# Patient Record
Sex: FEMALE | Race: WHITE | URBAN - METROPOLITAN AREA
[De-identification: names, ages, dates, MRNs, and addresses within clinical notes are randomized per-mention and may not be internally consistent; named-entity substitution may affect disease eponyms.]

---

## 2022-01-31 ENCOUNTER — OFFICE VISIT (OUTPATIENT)
Dept: ORTHOPEDIC SURGERY | Age: 37
End: 2022-01-31
Payer: COMMERCIAL

## 2022-01-31 VITALS — WEIGHT: 293 LBS | BODY MASS INDEX: 44.41 KG/M2 | HEIGHT: 68 IN

## 2022-01-31 DIAGNOSIS — M25.562 LEFT KNEE PAIN, UNSPECIFIED CHRONICITY: Primary | ICD-10-CM

## 2022-01-31 PROCEDURE — 99204 OFFICE O/P NEW MOD 45 MIN: CPT | Performed by: PHYSICIAN ASSISTANT

## 2022-01-31 NOTE — PROGRESS NOTES
Chief Complaint  Knee Pain (new patient left knee )      History of Present Illness:  Lissa Blank is a pleasant 29 y.o. female here for evaluation of left knee pain. Patient states that back in October she had a fall on a bent knee in the driveway that was very hard. Since then she has had some mild anterior knee pain. Patient noticed that the day after [place], after she had been quite active for several days in [place], she started experiencing pain and swelling and decreased range of motion. She presented to her primary care provider, he was concern for meniscus tear and an MRI was ordered. The MRI revealed swelling and a Baker's cyst but no meniscal involvement. Patient describes pain with kneeling and pain that is primarily anterior. She also describes an occasional catching sensation. Medical History:  Patient's medications, allergies, past medical, surgical, social and family histories were reviewed and updated as appropriate. Pain Assessment  Location of Pain: Knee  Location Modifiers: Left  Severity of Pain: 6  Quality of Pain: Dull,Aching  Duration of Pain: Persistent  Frequency of Pain: Constant  Aggravating Factors: Bending,Walking  Limiting Behavior: Yes  Relieving Factors: Rest  Result of Injury: Yes  Work-Related Injury: No  Are there other pain locations you wish to document?: No  ROS: Review of systems reviewed from Patient History Form completed today and available in the patient's chart under the Media tab. Pertinent items are noted in HPI  Review of systems reviewed from Patient History Form completed today and available in the patient's chart under the Media tab. Vital Signs:  Ht 5' 8\" (1.727 m)   Wt 295 lb (133.8 kg)   BMI 44.85 kg/m²      Left knee examination:    Gait: No use of assistive devices. No antalgic gait. Alignment: Alignment appreciated. Inspection/skin: Quadriceps well developed. Skin is intact without erythema or ecchymosis.  No gross deformity. Palpation: Crepitus. Nontender along joint line. No pain with compression of patella. Nontender to light touch. Range of Motion: Full ROM. Strength: 5/5 quad strength    Effusion: No apparent effusion. Ligamentous stability: Stable to valgus and varus stress at 0° and 30°. Solid endpoint with Lachman's. Negative posterior and anterior drawer signs. Patella tracking: Smooth translation of patella. Special tests: Negative Miguel sign. Patella apprehension sign negative. Neurologic and vascular: Skin is warm and well-perfused. Distally neurovascularly intact. Additional findings: Calf soft nontender. Sensation is intact to light-touch. No pretibial edema. Radiology:       Pertinent imaging was interpreted and reviewed with the patient today, images only - no report available. Radiographs were obtained and reviewed in the office; 4 views: bilateral PA, bilateral Devine, bilateral Merchants AND left lateral    Impression: No acute fracture or dislocation. No osseous abnormalities. There is no appreciable soft tissue swelling or joint effusion. There are no lytic or blastic lesions. Left knee MRI:  1. 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 Findings of patellar maltracking  3. No meniscal or ligamentous injuries  4. 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     Left knee pain, unspecified chronicity Yes       Office Procedures:  Orders Placed This Encounter   Procedures    XR KNEE LEFT (MIN 4 VIEWS)     Order Specific Question:   Reason for exam:     Answer:   PAIN    XR KNEE RIGHT (3 VIEWS)     Order Specific Question:   Reason for exam:     Answer:   PAIN         Plan: Pertinent imaging was reviewed. The etiology, natural history, and treatment options for the disorder were discussed. The roles of activity medication, antiinflammatories, injections, bracing, physical therapy, and surgical interventions were all described to the patient and questions were answered. Chest he suffered a fall several months ago on a bent knee. I suspect that this is where she sustained the subtle cartilage damage underneath her kneecap. This paired with longstanding patellar malalignment is likely because of her persistent swelling in her Baker's cyst.  She is taking meloxicam I would like her to continue doing so. I believe she is a candidate for formal supravesical therapy working on range of motion and strengthening. She would like to proceed with this. If no improvement she would be candidate for a cortisone injection. Follow-up in 1 month or sooner if worsening symptoms  Jaelyn Lucas is in agreement with this plan. All questions were answered to patient's satisfaction and was encouraged to call with any further questions. Total time spent for evaluation, education, and development of treatment plan: 49 minutes    See Acosta, [address] [place] [address]  1/31/2022    This dictation was performed with a verbal recognition program [facility]) and it was checked for errors. It is possible that there are still dictated errors within this office note. If so, please bring any areas to my attention for an addendum. All efforts were made to ensure that this office note is accurate.

## 2022-03-25 ENCOUNTER — HOSPITAL ENCOUNTER (OUTPATIENT)
Dept: PHYSICAL THERAPY | Age: 37
Setting detail: THERAPIES SERIES
Discharge: HOME | End: 2022-03-25

## 2022-03-25 NOTE — FLOWSHEET NOTE
Physical Therapy  Cancellation/No-show Note  Patient Name:  Doris Lopez  :  1985   Date:  3/25/2022  Cancelled visits to date: 1  No-shows to date: 0    For today's appointment patient:  [x]  Cancelled  []  Rescheduled appointment  []  No-show     Reason given by patient:  []  Patient ill  []  Conflicting appointment  []  No transportation    []  Conflict with work  []  No reason given  [x]  Other:     Comments: Needed to get kids      Electronically signed by:  Wade Crystal, PT, DPT

## 2022-04-05 ENCOUNTER — APPOINTMENT (OUTPATIENT)
Dept: PHYSICAL THERAPY | Age: 37
End: 2022-04-05
Payer: COMMERCIAL

## 2022-04-08 ENCOUNTER — HOSPITAL ENCOUNTER (OUTPATIENT)
Dept: PHYSICAL THERAPY | Age: 37
Setting detail: THERAPIES SERIES
Discharge: HOME | End: 2022-04-08
Payer: COMMERCIAL

## 2022-04-08 PROCEDURE — 97161 PT EVAL LOW COMPLEX 20 MIN: CPT

## 2022-04-08 PROCEDURE — 97110 THERAPEUTIC EXERCISES: CPT

## 2022-04-08 PROCEDURE — 97112 NEUROMUSCULAR REEDUCATION: CPT

## 2022-04-08 NOTE — PLAN OF CARE
The [address] [place] [address]  Phone [phone]  Fax [phone]                                                       Physical Therapy Certification    Dear Referring Practitioner: Jeremiah Lara PA-C,    We had the pleasure of evaluating the following patient for physical therapy services at [address] [place] [address]. A summary of our findings can be found in the initial assessment below. This includes our plan of care. If you have any questions or concerns regarding these findings, please do not hesitate to contact me at the office phone number checked above. Thank you for the referral.       Physician Signature:_______________________________Date:__________________  By signing above (or electronic signature), therapists plan is approved by physician      Patient: Marizol Rausch   : 1985   MRN: [id number]  Referring Physician: Referring Practitioner: Jeremiah Lara PA-C      Evaluation Date: 2022      Medical Diagnosis Information:  Diagnosis: M25.562 Left knee pain   Treatment Diagnosis: M25.562 Left knee pain                                         Insurance information: PT Insurance Information: [employer]     Precautions/ Contra-indications: N/A    C-SSRS Triggered by Intake questionnaire (Past 2 wk assessment):   [x] No, Questionnaire did not trigger screening.   [] Yes, Patient intake triggered further evaluation      [] C-SSRS Screening completed  [] PCP notified via Plan of Care  [] Emergency services notified     Latex Allergy:  [x]NO      []YES  Preferred Language for Healthcare:   [x]English       []other:    SUBJECTIVE: Patient arrived to physical therapy with c/o L knee pain. Symptoms began after she experienced a fall on a bent knee in her driveway on [address].  Since then she has had anterior knee discomfort that increases with squatting, stairs and being on her feet for prolonged periods of time. Originally saw her PCP who ordered and MRI with results provided below. She saw referring provider on 1/31/22 and was instructed to continue to take Meloxicam and attempt PT. If symptoms do not improve it was recommended she f/u with referring practitioner for possible injection. Has not been able to start PT d/t having COVID and then traveling. Wears inserts in shoes that she exercises in. Left knee MRI: January 2022  1. 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 Findings of patellar maltracking  3. No meniscal or ligamentous injuries  4. Small suprapatellar effusion       Relevant Medical History: R ACL reconstruction 2003, Patella maltracking 20 years ago treated with bracing and PT. Functional Disability Index: FOTO knee 47     Pain Scale: 1-6/10  Easing factors: Meloxicam and icing  Provocative factors: Squatting, stairs, prolonged sitting, long walks or cleaning all day.       Type: [x]Constant   [x]Intermittent  []Radiating []Localized []other:     Numbness/Tingling: Denies     Occupation/School:     Living Status/Prior Level of Function: Independent with ADLs and IADLs, walking for exercise, light weight lifting and yoga    OBJECTIVE:      Flexibility L R Comment   Hamstring 162 170 Measured 90/90    Gastroc 100 95    Nikki Mild restriction WNL    Ely WNL WNL            ROM PROM AROM Comment    L R L R    Flexion   130 130    Extension   +1 +2                        Strength L R Comment   Quad 5 5    Hamstring 5 5    Hip  flexion 4- 4    Hip abd 5 5    Hip add 5 mild pain 5    Hip ext  5 5        Special Test Results/Comment   Patella compression Neg bilat   Crepitus Neg bilat    Flexion Test Neg R  Pos L mild increase in pain    Valgus Laxity Neg bilat for pain or instability    Varus Laxity Neg bilat for pain or instability    McMurrays Neg R  Pos L medial and laterally        Girth L R   Joint line  48 cm 48 cm      Reflexes/Sensation:    [x]Dermatomes/Myotomes intact    []Reflexes equal and normal bilaterally   []Other:    Joint mobility: Patella    [x]Normal    []Hypo   []Hyper    Palpation: 's cyst palpable L posterior knee. Tenderness bilateral pes anserine     Functional Mobility/Transfers:   SLS: Eyes open, no Trendelenburg and no postural sway, mild pain on L  DL squat: depth 75% of normal with c/o L knee pain preventing her from going further. Posture: Pes cavus bilaterally in standing, mild lateral patella tilt and glide bilaterally    Bandages/Dressings/Incisions:  N/A    Gait: Independent, WNL     Orthopedic Special Tests: See above                        [x] Patient history, allergies, meds reviewed. Medical chart reviewed. See intake form. Review Of Systems (ROS):  [x]Performed Review of systems (Integumentary, CardioPulmonary, Neurological) by intake and observation. Intake form has been scanned into medical record. Patient has been instructed to contact their primary care physician regarding ROS issues if not already being addressed at this time.       Co-morbidities/Complexities (which will affect course of rehabilitation):   []None           Arthritic conditions   []Rheumatoid arthritis (M05.9)  []Osteoarthritis (M19.91)   Cardiovascular conditions   []Hypertension (I10)  []Hyperlipidemia (E78.5)  []Angina pectoris (I20)  []Atherosclerosis (I70)   Musculoskeletal conditions   []Disc pathology   []Congenital spine pathologies   [x]Prior surgical intervention  []Osteoporosis (M81.8)  []Osteopenia (M85.8)   Endocrine conditions   []Hypothyroid (E03.9)  []Hyperthyroid Gastrointestinal conditions   []Constipation (Q78.66)   Metabolic conditions   []Morbid obesity (E66.01)  []Diabetes type 1(E10.65) or 2 (E11.65)   []Neuropathy (G60.9)     Pulmonary conditions   []Asthma (J45)  []Coughing   []COPD (J44.9)   Psychological Disorders  []Anxiety (F41.9)  []Depression (F32.9)   []Other:   []Other:          Barriers to/and or personal factors that will affect rehab potential:              []Age  []Sex              []Motivation/Lack of Motivation                        []Co-Morbidities              []Cognitive Function, education/learning barriers              []Environmental, home barriers              [x]profession/work barriers  []past PT/medical experience  []other:  Justification: Pt job duties requiring her to be on her feet for long periods of time as well as squat and kneel presents as a barrier to treatment. Falls Risk Assessment (30 days):   [x] Falls Risk assessed and no intervention required.   [] Falls Risk assessed and Patient requires intervention due to being higher risk   TUG score (>12s at risk):     [] Falls education provided, including     ASSESSMENT:   Functional Impairments:     []Noted lumbar/proximal hip/LE hypomobility   []Decreased LE functional ROM   [x]Decreased core/proximal hip strength and neuromuscular control   [x]Decreased LE functional strength   []Reduced balance/proprioceptive control   []other:      Functional Activity Limitations (from functional questionnaire and intake)   []Reduced ability to tolerate prolonged functional positions   []Reduced ability or difficulty with changes of positions or transfers between positions   []Reduced ability to maintain good posture and demonstrate good body mechanics with sitting, bending, and lifting   []Reduced ability to sleep   [] Reduced ability or tolerance with driving and/or computer work   [x]Reduced ability to perform lifting, carrying tasks   [x]Reduced ability to squat   []Reduced ability to forward bend   [x]Reduced ability to ambulate prolonged functional periods/distances/surfaces   [x]Reduced ability to ascend/descend stairs   [x]Reduced ability to run, hop or jump   []other:     Participation assessment instrument and/or measurable assessment of functional outcome. [x] EVAL (LOW) 81102 (typically 20 minutes face-to-face)  [] EVAL (MOD) 32768 (typically 30 minutes face-to-face)  [] EVAL (HIGH) 30101 (typically 45 minutes face-to-face)  [] RE-EVAL     PLAN  Frequency/Duration:  1 days per week for 6 Weeks:  Interventions:  [x]  Therapeutic exercise including: strength training, ROM, for Lower extremity and core   [x]  NMR activation and proprioception for LE, Glutes and Core   [x]  Manual therapy as indicated for LE, Hip and spine to include: Dry Needling/IASTM, STM, PROM, Gr I-IV mobilizations, manipulation. [x] Modalities as needed that may include: thermal agents, E-stim, Biofeedback, US, iontophoresis as indicated  [x] Patient education on joint protection, postural re-education, activity modification, progression of HEP. HEP instruction:   Access Code: [email or web address]  URL: [email or web address]  Date: 04/08/2022  Prepared by: Pinky Snowden  Hooklying Hamstring Stretch with Strap - 1-2 x daily - 7 x weekly - 1 sets - 3 reps - 30 hold  Supine ITB Stretch with Strap - 1-3 x daily - 7 x weekly - 1 sets - 3 reps - 30 hold  Standing Terminal Knee Extension with Resistance - 1 x daily - 7 x weekly - 3 sets - 10 reps  Seated Long Arc Quad with Hip Adduction - 1 x daily - 7 x weekly - 3 sets - 10 reps  Straight Leg Raise with Arm Support - 1 x daily - 7 x weekly - 1 sets - 3 reps - 20-30 hold      GOALS:   Patient stated goal: Eliminate knee pain in order to return to exercising independently     [] Progressing: [] Met: [] Not Met: [] Adjusted    Therapist goals for Patient:   Short Term Goals: To be achieved in: 3 weeks  1. Independent in HEP and progression per patient tolerance, in order to prevent re-injury. [] Progressing: [] Met: [] Not Met: [] Adjusted   2.  Patient will have a decrease in pain to facilitate improvement in movement, function, and ADLs as indicated by Functional Deficits. [] Progressing: [] Met: [] Not Met: [] Adjusted    Long Term Goals: To be achieved in: 6 weeks  1. Patient will score a 71 or higher on FOTO knee assessment indicating subjective ability to complete advanced ambulation without pain or issues. [] Progressing: [] Met: [] Not Met: [] Adjusted  2. Patient will demonstrate increased L LE flexibility that is equal to R causing decrease tension at L knee and ability for pt to sit for prolonged periods of time without c/o knee pain    [] Progressing: [] Met: [] Not Met: [] Adjusted  3. Patient will demonstrate an increase in L LE Strength to 5/5 in all directions tested to allow for proper functional mobility such as negotiating a flight of stairs with proper sequencing and no c/o pain. [] Progressing: [] Met: [] Not Met: [] Adjusted  4. Patient will complete DL squat with normal depth and no c/o pain. [] Progressing: [] Met: [] Not Met: [] Adjusted         Electronically signed by:  Juan Webber PT    Note: If patient does not return for scheduled/ recommended follow up visits, this note will serve as a discharge from care along with most recent update on progress.

## 2022-04-08 NOTE — FLOWSHEET NOTE
[address] [facility], [facility] [email or web address] [facility], [address] [place] [address]  Phone: [phone]   Fax:     [phone]    Physical Therapy Daily Treatment Note  Date:  2022    Patient Name:  Caterina Lara    :  1985  MRN: [id number]  Restrictions/Precautions:    Medical/Treatment Diagnosis Information:  · Diagnosis: M25.562 Left knee pain  · Treatment Diagnosis: M25.562 Left knee pain  Insurance/Certification information:  PT Insurance Information: [place]  Physician Information:  Referring Practitioner: Layla Arevalo PA-C  Has the plan of care been signed (Y/N):        []  Yes  [x]  No     Date of Patient follow up with Physician:       Is this a Progress Report:     []  Yes  [x]  No        If Yes:  Date Range for reporting period:  Beginning   Ending    Progress report will be due (10 Rx or 30 days whichever is less):        Recertification will be due (POC Duration  / 90 days whichever is less): 22         Visit # Insurance Allowable Auth Required   1 [place]  40 visits  3 used []  Yes [x]  No        Functional Scale: FOTO knee 47    Date assessed:  22       Latex Allergy:  [x]NO      []YES  Preferred Language for Healthcare:   [x]English       []other:    Pain level:  1/10     SUBJECTIVE:  See eval    OBJECTIVE: See eval   Observation:    Test measurements:      RESTRICTIONS/PRECAUTIONS: N/A    Exercises/Interventions:   Exercise/Equipment Resistance/Repetitions Other comments   Stretching/AROM       Hamstring 30\"hx3 L 4/8 supine   ITB stretch  30\"hx3 L 4/8 supine                    Strengthening      SLR + 10\"hx5 L 4/8 hook lying                           Neuromuscular re-ed      TKE  [place] TB 3x10 L 4/8   LAQ with BS 5\"hx10 L 4/8 seated EOT 90<>45 deg                Quantum machines       Leg press        Leg extension       Leg curl               Manual interventions                Gabby taping L patella to correct lateral tilt and glide 4/8                Therapeutic Exercise and NMR EXR  [x] (61725) Provided verbal/tactile cueing for activities related to strengthening, flexibility, endurance, ROM for improvements in LE, proximal hip, and core control with self care, mobility, lifting, ambulation.  [] (62790) Provided verbal/tactile cueing for activities related to improving balance, coordination, kinesthetic sense, posture, motor skill, proprioception  to assist with LE, proximal hip, and core control in self care, mobility, lifting, ambulation and eccentric single leg control.      NMR and Therapeutic Activities:    [x] (78757 or 54362) Provided verbal/tactile cueing for activities related to improving balance, coordination, kinesthetic sense, posture, motor skill, proprioception and motor activation to allow for proper function of core, proximal hip and LE with self care and ADLs  [] (71262) Gait Re-education- Provided training and instruction to the patient for proper LE, core and proximal hip recruitment and positioning and eccentric body weight control with ambulation re-education including up and down stairs     Home Exercise Program:    [x] (97478) Reviewed/Progressed HEP activities related to strengthening, flexibility, endurance, ROM of core, proximal hip and LE for functional self-care, mobility, lifting and ambulation/stair navigation   [] (52073)Reviewed/Progressed HEP activities related to improving balance, coordination, kinesthetic sense, posture, motor skill, proprioception of core, proximal hip and LE for self care, mobility, lifting, and ambulation/stair navigation      Manual Treatments:  PROM / STM / Oscillations-Mobs:  G-I, II, III, IV (PA's, Inf., Post.)  [] (42088) Provided manual therapy to mobilize LE, proximal hip and/or LS spine soft tissue/joints for the purpose of modulating pain, promoting relaxation,  increasing ROM, reducing/eliminating soft tissue swelling/inflammation/restriction, improving soft tissue extensibility and allowing for proper ROM for normal function with self care, mobility, lifting and ambulation. Modalities:      Charges:  Timed Code Treatment Minutes: 24'+eval    Total Treatment Minutes: 2:39-3:35  64'       [x] EVAL (LOW) 24969 (typically 20 minutes face-to-face)  [] EVAL (MOD) 85461 (typically 30 minutes face-to-face)  [] EVAL (HIGH) 52676 (typically 45 minutes face-to-face)  [] RE-EVAL   [x] LF(52278) x 1  [] IONTO  [x] NMR (02165) x 1    [] VASO  [] Manual (76477) x      [] Other:  [] TA x      [] Mech Traction (90005)  [] ES(attended) (71029)      [] ES (un) (75040):     GOALS:   Patient stated goal: Eliminate knee pain in order to return to exercising independently     []? Progressing: []? Met: []? Not Met: []? Adjusted     Therapist goals for Patient:   Short Term Goals: To be achieved in: 3 weeks  1. Independent in HEP and progression per patient tolerance, in order to prevent re-injury. []? Progressing: []? Met: []? Not Met: []? Adjusted   2. Patient will have a decrease in pain to facilitate improvement in movement, function, and ADLs as indicated by Functional Deficits. []? Progressing: []? Met: []? Not Met: []? Adjusted     Long Term Goals: To be achieved in: 6 weeks  1. Patient will score a 71 or higher on FOTO knee assessment indicating subjective ability to complete advanced ambulation without pain or issues. []? Progressing: []? Met: []? Not Met: []? Adjusted  2. Patient will demonstrate increased L LE flexibility that is equal to R causing decrease tension at L knee and ability for pt to sit for prolonged periods of time without c/o knee pain    []? Progressing: []? Met: []? Not Met: []? Adjusted  3. Patient will demonstrate an increase in L LE Strength to 5/5 in all directions tested to allow for proper functional mobility such as negotiating a flight of stairs with proper sequencing and no c/o pain. []? Progressing: []? Met: []?  Not Met: []? Adjusted  4. Patient will complete DL squat with normal depth and no c/o pain. []? Progressing: []? Met: []? Not Met: []? Adjusted    Overall Progression Towards Functional goals/ Treatment Progress Update:  [] Patient is progressing as expected towards functional goals listed. [] Progression is slowed due to complexities/Impairments listed. [] Progression has been slowed due to co-morbidities. [x] Plan just implemented, too soon to assess goals progression <30days   [] Goals require adjustment due to lack of progress  [] Patient is not progressing as expected and requires additional follow up with physician  [] Other    Prognosis for POC: [x] Good [] Fair  [] Poor      Patient requires continued skilled intervention: [x] Yes  [] No    Treatment/Activity Tolerance:  [x] Patient able to complete treatment  [] Patient limited by fatigue  [] Patient limited by pain     [] Patient limited by other medical complications  [] Other: 4/8 Tolerated taping well and responded well to exercises with appropriate fatigue and no increase in symptoms. Patient Education:         4/8 Educated on activity modification based on symptoms, proper muscle activation, use of taping annd icing for symptom control. HEP instruction:   Access Code: [email or web address]  URL: [email or web address]  Date: 04/08/2022  Prepared by: Fredrick Rosario     Exercises  Hooklying Hamstring Stretch with Strap - 1-2 x daily - 7 x weekly - 1 sets - 3 reps - 30 hold  Supine ITB Stretch with Strap - 1-3 x daily - 7 x weekly - 1 sets - 3 reps - 30 hold  Standing Terminal Knee Extension with Resistance - 1 x daily - 7 x weekly - 3 sets - 10 reps  Seated Long Arc Quad with Hip Adduction - 1 x daily - 7 x weekly - 3 sets - 10 reps  Straight Leg Raise with Arm Support - 1 x daily - 7 x weekly - 1 sets - 3 reps - 20-30 hold           PLAN: See eval  [] Continue per plan of care [] Alter current plan (see comments above)  [x] Plan of care initiated [] Hold pending MD visit [] Discharge      Electronically signed by:  Aron Aparicio PT    Note: If patient does not return for scheduled/ recommended follow up visits, this note will serve as a discharge from care along with most recent update on progress.

## 2022-04-13 ENCOUNTER — HOSPITAL ENCOUNTER (OUTPATIENT)
Dept: PHYSICAL THERAPY | Age: 37
Setting detail: THERAPIES SERIES
Discharge: HOME | End: 2022-04-13
Payer: COMMERCIAL

## 2022-04-13 NOTE — FLOWSHEET NOTE
Physical Therapy  Cancellation/No-show Note  Patient Name:  Stephen Driver  :  1985   Date:  2022  Cancelled visits to date: 0  No-shows to date: 1    For today's appointment patient:  []  Cancelled  []  Rescheduled appointment  [x]  No-show     Reason given by patient:  []  Patient ill  []  Conflicting appointment  []  No transportation    []  Conflict with work  []  No reason given  []  Other:     Comments:      Electronically signed by:  Ruddy Andrews PT, PT

## 2022-04-19 ENCOUNTER — HOSPITAL ENCOUNTER (OUTPATIENT)
Dept: PHYSICAL THERAPY | Age: 37
Setting detail: THERAPIES SERIES
Discharge: HOME | End: 2022-04-19
Payer: COMMERCIAL

## 2022-04-19 PROCEDURE — 97112 NEUROMUSCULAR REEDUCATION: CPT

## 2022-04-19 PROCEDURE — 97110 THERAPEUTIC EXERCISES: CPT

## 2022-04-19 NOTE — FLOWSHEET NOTE
[facility], [address] [place] [address], [address] [place] [address]  Phone: [phone]   Fax:     [phone]    Physical Therapy Daily Treatment Note  Date:  2022    Patient Name:  Muriel Chaves    :  1985  MRN: [id number]  Restrictions/Precautions:    Medical/Treatment Diagnosis Information:  · Diagnosis: M25.562 Left knee pain  · Treatment Diagnosis: M25.562 Left knee pain  Insurance/Certification information:  PT Insurance Information: [place]  Physician Information:  Referring Practitioner: Raven Ceron PA-C  Has the plan of care been signed (Y/N):        []  Yes  [x]  No     Date of Patient follow up with Physician:       Is this a Progress Report:     []  Yes  [x]  No        If Yes:  Date Range for reporting period:  Beginning   Ending    Progress report will be due (10 Rx or 30 days whichever is less):        Recertification will be due (POC Duration  / 90 days whichever is less): 22         Visit # Insurance Allowable Auth Required   2    [place]  40 visits  3 used []  Yes [x]  No        Functional Scale: FOTO knee 47    Date assessed:  22       Latex Allergy:  [x]NO      []YES  Preferred Language for Healthcare:   [x]English       []other:    Pain level:  1/10 4/19    SUBJECTIVE:   Pt states generally walking is better and stride feels more normal. Still has discomfort with kneeling, getting up off ground or seated position as well as stairs. 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      OBJECTIVE: See eval   Observation:    Test measurements:      RESTRICTIONS/PRECAUTIONS: N/A    Exercises/Interventions:   Exercise/Equipment Resistance/Repetitions Other comments   Stretching/AROM       Bike x5' AROM     Hamstring 30\"hx3 L  supine   ITB stretch  30\"hx3 L  supine                    Strengthening      SLR + 20\"hx3 L ^ hook lying Wall sit 30\"hx3 bilat Start 4/19 45 deg knee flexion    SLR abd with hip ext/flex 0# 3x10 L Start 4/19 side lying   [place]ridges SL 3x10 L Start 4/19 L knee bent              Neuromuscular re-ed      TKE CC 4 plates  8P49 L ^2/35   LAQ with BS 10\"hx10 L ^4/19 seated EOT 90<>45 deg   SLS with hip abd 3x10 L Start 4/19           Quantum machines       Leg press   80# 3x10 DL concentric, SL eccentric L Start 4/19 90<> 45 deg    Leg extension       Leg curl               Manual interventions                Gabby taping L patella to correct lateral tilt and glide 4/8 4/19 completed d/t positive response after last visit                Therapeutic Exercise and NMR EXR  [x] (79016) Provided verbal/tactile cueing for activities related to strengthening, flexibility, endurance, ROM for improvements in LE, proximal hip, and core control with self care, mobility, lifting, ambulation.  [] (46447) Provided verbal/tactile cueing for activities related to improving balance, coordination, kinesthetic sense, posture, motor skill, proprioception  to assist with LE, proximal hip, and core control in self care, mobility, lifting, ambulation and eccentric single leg control.      NMR and Therapeutic Activities:    [x] (50140 or 94532) Provided verbal/tactile cueing for activities related to improving balance, coordination, kinesthetic sense, posture, motor skill, proprioception and motor activation to allow for proper function of core, proximal hip and LE with self care and ADLs  [] (65711) Gait Re-education- Provided training and instruction to the patient for proper LE, core and proximal hip recruitment and positioning and eccentric body weight control with ambulation re-education including up and down stairs     Home Exercise Program:    [x] (50313) Reviewed/Progressed HEP activities related to strengthening, flexibility, endurance, ROM of core, proximal hip and LE for functional self-care, mobility, lifting and ambulation/stair navigation   [] (42695)Reviewed/Progressed HEP activities related to improving balance, coordination, kinesthetic sense, posture, motor skill, proprioception of core, proximal hip and LE for self care, mobility, lifting, and ambulation/stair navigation      Manual Treatments:  PROM / STM / Oscillations-Mobs:  G-I, II, III, IV (PA's, Inf., Post.)  [] (78332) Provided manual therapy to mobilize LE, proximal hip and/or LS spine soft tissue/joints for the purpose of modulating pain, promoting relaxation,  increasing ROM, reducing/eliminating soft tissue swelling/inflammation/restriction, improving soft tissue extensibility and allowing for proper ROM for normal function with self care, mobility, lifting and ambulation. Modalities:      Charges:  Timed Code Treatment Minutes: 39'   Total Treatment Minutes: 2:09-3:10  64'       [] EVAL (LOW) 455 1011 (typically 20 minutes face-to-face)  [] EVAL (MOD) 73453 (typically 30 minutes face-to-face)  [] EVAL (HIGH) 08772 (typically 45 minutes face-to-face)  [] RE-EVAL   [x] XG(65080) x 2  [] IONTO  [x] NMR (01286) x 1    [] VASO  [] Manual (19291) x      [] Other:  [] TA x      [] Mech Traction (20838)  [] ES(attended) (00481)      [] ES (un) (57251):     GOALS:   Patient stated goal: Eliminate knee pain in order to return to exercising independently     []? Progressing: []? Met: []? Not Met: []? Adjusted     Therapist goals for Patient:   Short Term Goals: To be achieved in: 3 weeks  1. Independent in HEP and progression per patient tolerance, in order to prevent re-injury. []? Progressing: []? Met: []? Not Met: []? Adjusted   2. Patient will have a decrease in pain to facilitate improvement in movement, function, and ADLs as indicated by Functional Deficits. []? Progressing: []? Met: []? Not Met: []? Adjusted     Long Term Goals: To be achieved in: 6 weeks  1.  Patient will score a 71 or higher on FOTO knee assessment indicating subjective ability to complete advanced ambulation without pain or issues. []? Progressing: []? Met: []? Not Met: []? Adjusted  2. Patient will demonstrate increased L LE flexibility that is equal to R causing decrease tension at L knee and ability for pt to sit for prolonged periods of time without c/o knee pain    []? Progressing: []? Met: []? Not Met: []? Adjusted  3. Patient will demonstrate an increase in L LE Strength to 5/5 in all directions tested to allow for proper functional mobility such as negotiating a flight of stairs with proper sequencing and no c/o pain. []? Progressing: []? Met: []? Not Met: []? Adjusted  4. Patient will complete DL squat with normal depth and no c/o pain. []? Progressing: []? Met: []? Not Met: []? Adjusted    Overall Progression Towards Functional goals/ Treatment Progress Update:  [] Patient is progressing as expected towards functional goals listed. [] Progression is slowed due to complexities/Impairments listed. [] Progression has been slowed due to co-morbidities. [x] Plan just implemented, too soon to assess goals progression <30days   [] Goals require adjustment due to lack of progress  [] Patient is not progressing as expected and requires additional follow up with physician  [] Other    Prognosis for POC: [x] Good [] Fair  [] Poor      Patient requires continued skilled intervention: [x] Yes  [] No    Treatment/Activity Tolerance:  [x] Patient able to complete treatment  [] Patient limited by fatigue  [] Patient limited by pain     [] Patient limited by other medical complications  [] Other: 4/19 Pt fatigued with increase in intensity of treatment with no adverse effects noted or reported. Overall appears to be responding well to PT but needs to continue to work on strength and dynamic balance to improve stability at L knee. Patient Education:         4/19 Updated HEP based on tolerance to treatment.    4/8 Educated on activity modification based on symptoms, proper muscle activation, use of taping annd icing for symptom control. HEP instruction:   Access Code: [email or web address]        PLAN: See eval  [x] Continue per plan of care [] Alter current plan (see comments above)  [x] Plan of care initiated [] Hold pending MD visit [] Discharge  4/19 Monitor symptoms and progress strengthening and dynamic balance in weight bearing as tolerated. Electronically signed by:  Stuart Nyhan, PT    Note: If patient does not return for scheduled/ recommended follow up visits, this note will serve as a discharge from care along with most recent update on progress.

## 2022-04-26 ENCOUNTER — HOSPITAL ENCOUNTER (OUTPATIENT)
Dept: PHYSICAL THERAPY | Age: 37
Setting detail: THERAPIES SERIES
Discharge: HOME | End: 2022-04-26
Payer: COMMERCIAL

## 2022-04-26 PROCEDURE — 97112 NEUROMUSCULAR REEDUCATION: CPT

## 2022-04-26 PROCEDURE — 97110 THERAPEUTIC EXERCISES: CPT

## 2022-04-26 NOTE — FLOWSHEET NOTE
[facility], [address] [place] [address], [address] [place] [address]  Phone: [phone]   Fax:     [phone]    Physical Therapy Daily Treatment Note  Date:  2022    Patient Name:  Janeth Rodriguez    :  1985  MRN: [id number]  Restrictions/Precautions:    Medical/Treatment Diagnosis Information:  · Diagnosis: M25.562 Left knee pain  · Treatment Diagnosis: M25.562 Left knee pain  Insurance/Certification information:  PT Insurance Information: [place]  Physician Information:  Referring Practitioner: Thaddeus Sweet PA-C  Has the plan of care been signed (Y/N):        [x]  Yes  []  No     Date of Patient follow up with Physician:       Is this a Progress Report:     []  Yes  [x]  No        If Yes:  Date Range for reporting period:  Beginning   Ending    Progress report will be due (10 Rx or 30 days whichever is less):  3/66/55      Recertification will be due (POC Duration  / 90 days whichever is less): 22         Visit # Insurance Allowable Auth Required   3    [place]  40 visits  3 used []  Yes [x]  No        Functional Scale: FOTO knee 47    Date assessed:  22       Latex Allergy:  [x]NO      []YES  Preferred Language for Healthcare:   [x]English       []other:    Pain level:  0/10     SUBJECTIVE:   Pt states knee is feeling fine and not noticing abscense of taping. Has slacked with HEP this week d/t being busy. No pain at rest but still has pain with sit to stand, squatting and stairs.      OBJECTIVE: See eval   Observation:    Test measurements:      RESTRICTIONS/PRECAUTIONS: N/A    Exercises/Interventions:   Exercise/Equipment Resistance/Repetitions Other comments   Stretching/AROM       Bike x5' AROM     Hamstring 30\"hx3 L  supine   ITB stretch  30\"hx3 L  supine                    Strengthening      SLR + 30\"hx3 L ^    Wall sit 30\"hx3 bilat Start  45 deg knee flexion    SLR abd with hip ext/flex 1# 3x10 L ^4/26 side lying   Bridges SL 3x10 L Start 4/19 L knee bent              Neuromuscular re-ed      TKE CC 5 plates  5C11 L ^3/43   LAQ with BS 3# 10\"hx10 L ^4/26 seated EOT 90<>45 deg   SLS with hip abd 3x10 L Start 4/19     LTD 2x10 L Start 4/26 4\" step, cues to avoid forward knee flexion    SLB 30\"hx3 L Start 4/26 [employer] postural stability L12           Quantum machines       Leg press   100# 3x10 DL concentric, SL eccentric L ^4/26 90<> 20 deg    Leg extension  15# 3x10 DL concentric, SL eccentric L  Start 4/26 90<>45    Leg curl               Manual interventions                Pierre taping  4/8 4/19 completed d/t positive response after last visit                Therapeutic Exercise and NMR EXR  [x] (76873) Provided verbal/tactile cueing for activities related to strengthening, flexibility, endurance, ROM for improvements in LE, proximal hip, and core control with self care, mobility, lifting, ambulation.  [] (33305) Provided verbal/tactile cueing for activities related to improving balance, coordination, kinesthetic sense, posture, motor skill, proprioception  to assist with LE, proximal hip, and core control in self care, mobility, lifting, ambulation and eccentric single leg control.      NMR and Therapeutic Activities:    [x] (88995 or 13633) Provided verbal/tactile cueing for activities related to improving balance, coordination, kinesthetic sense, posture, motor skill, proprioception and motor activation to allow for proper function of core, proximal hip and LE with self care and ADLs  [] (54504) Gait Re-education- Provided training and instruction to the patient for proper LE, core and proximal hip recruitment and positioning and eccentric body weight control with ambulation re-education including up and down stairs     Home Exercise Program:    [x] (51543) Reviewed/Progressed HEP activities related to strengthening, flexibility, endurance, ROM of core, proximal hip and LE for functional self-care, mobility, lifting and ambulation/stair navigation   [] (83537)Reviewed/Progressed HEP activities related to improving balance, coordination, kinesthetic sense, posture, motor skill, proprioception of core, proximal hip and LE for self care, mobility, lifting, and ambulation/stair navigation      Manual Treatments:  PROM / STM / Oscillations-Mobs:  G-I, II, III, IV (PA's, Inf., Post.)  [] (62870) Provided manual therapy to mobilize LE, proximal hip and/or LS spine soft tissue/joints for the purpose of modulating pain, promoting relaxation,  increasing ROM, reducing/eliminating soft tissue swelling/inflammation/restriction, improving soft tissue extensibility and allowing for proper ROM for normal function with self care, mobility, lifting and ambulation. Modalities:      Charges:  Timed Code Treatment Minutes: 52'   Total Treatment Minutes: 2:05-2:57  46'       [] EVAL (LOW) 455 1011 (typically 20 minutes face-to-face)  [] EVAL (MOD) 78955 (typically 30 minutes face-to-face)  [] EVAL (HIGH) 99823 (typically 45 minutes face-to-face)  [] RE-EVAL   [x] HI(26637) x 2  [] IONTO  [x] NMR (60009) x 1    [] VASO  [] Manual (18941) x      [] Other:  [] TA x      [] Mech Traction (95122)  [] ES(attended) (61846)      [] ES (un) (96592):     GOALS:   Patient stated goal: Eliminate knee pain in order to return to exercising independently     []? Progressing: []? Met: []? Not Met: []? Adjusted     Therapist goals for Patient:   Short Term Goals: To be achieved in: 3 weeks  1. Independent in HEP and progression per patient tolerance, in order to prevent re-injury. []? Progressing: []? Met: []? Not Met: []? Adjusted   2. Patient will have a decrease in pain to facilitate improvement in movement, function, and ADLs as indicated by Functional Deficits. []? Progressing: []? Met: []? Not Met: []? Adjusted     Long Term Goals: To be achieved in: 6 weeks  1.  Patient will score a 71 or higher on FOTO knee assessment indicating subjective ability to complete advanced ambulation without pain or issues. []? Progressing: []? Met: []? Not Met: []? Adjusted  2. Patient will demonstrate increased L LE flexibility that is equal to R causing decrease tension at L knee and ability for pt to sit for prolonged periods of time without c/o knee pain    []? Progressing: []? Met: []? Not Met: []? Adjusted  3. Patient will demonstrate an increase in L LE Strength to 5/5 in all directions tested to allow for proper functional mobility such as negotiating a flight of stairs with proper sequencing and no c/o pain. []? Progressing: []? Met: []? Not Met: []? Adjusted  4. Patient will complete DL squat with normal depth and no c/o pain. []? Progressing: []? Met: []? Not Met: []? Adjusted    Overall Progression Towards Functional goals/ Treatment Progress Update:  [] Patient is progressing as expected towards functional goals listed. [] Progression is slowed due to complexities/Impairments listed. [] Progression has been slowed due to co-morbidities. [x] Plan just implemented, too soon to assess goals progression <30days   [] Goals require adjustment due to lack of progress  [] Patient is not progressing as expected and requires additional follow up with physician  [] Other    Prognosis for POC: [x] Good [] Fair  [] Poor      Patient requires continued skilled intervention: [x] Yes  [] No    Treatment/Activity Tolerance:  [x] Patient able to complete treatment  [] Patient limited by fatigue  [] Patient limited by pain     [] Patient limited by other medical complications  [] Other: 4/26 Pt was fatigued with increase in intensity of treatment but no increase in knee pain. She required cues to avoid excessive forward knee flexion with LTD which she responded well to but stopped after 2 sets d/t fatigue and wanting to avoid compensation.  Overall responding well to treatment but needs to continue to work on strength and stability Patient Education:         4/26 Updated HEP   4/19 Updated HEP based on tolerance to treatment. 4/8 Educated on activity modification based on symptoms, proper muscle activation, use of taping annd icing for symptom control. HEP instruction:   Access Code: [email or web address]        PLAN: See eval  [x] Continue per plan of care [] Alter current plan (see comments above)  [x] Plan of care initiated [] Hold pending MD visit [] Discharge  4/26 Monitor symptoms and progress strengthening and dynamic balance in weight bearing as tolerated. Electronically signed by:  Kavita Blake PT    Note: If patient does not return for scheduled/ recommended follow up visits, this note will serve as a discharge from care along with most recent update on progress.

## 2022-05-03 ENCOUNTER — HOSPITAL ENCOUNTER (OUTPATIENT)
Dept: PHYSICAL THERAPY | Age: 37
Setting detail: THERAPIES SERIES
Discharge: HOME | End: 2022-05-03
Payer: COMMERCIAL

## 2022-05-03 PROCEDURE — 97530 THERAPEUTIC ACTIVITIES: CPT

## 2022-05-03 PROCEDURE — 97110 THERAPEUTIC EXERCISES: CPT

## 2022-05-03 PROCEDURE — 97112 NEUROMUSCULAR REEDUCATION: CPT

## 2022-05-03 NOTE — PROGRESS NOTES
The [address] [facility], [facility] [employer] [employer] [address], [address] [facility]  Phone: [phone]   Fax:     [phone]    Physical Therapy Daily Treatment Note  Date:  5/3/2022    Patient Name:  Karina Olmos    :  1985  MRN: [id number]  Restrictions/Precautions:    Medical/Treatment Diagnosis Information:  · Diagnosis: M25.562 Left knee pain  · Treatment Diagnosis: M25.562 Left knee pain  Insurance/Certification information:  PT Insurance Information: [place]  Physician Information:  Referring Practitioner: Tova Johnson PA-C  Has the plan of care been signed (Y/N):        [x]  Yes  []  No     Date of Patient follow up with Physician:       Is this a Progress Report:     [x]  Yes  []  No        If Yes:  Date Range for reporting period:  Beginning   Ending 5/3    Progress report will be due (10 Rx or 30 days whichever is less):  07      Recertification will be due (POC Duration  / 90 days whichever is less): 22         Visit # Insurance Allowable Auth Required   4  5/3  [place]  40 visits  3 used []  Yes [x]  No        Functional Scale:   FOTO knee 54    Date assessed: 5/3  FOTO knee 47    Date assessed:  22       Latex Allergy:  [x]NO      []YES  Preferred Language for Healthcare:   [x]English       []other:    Pain level:  0/10 5/3    SUBJECTIVE:  5/3 Pt states overall she is doing well but missed anti-inflammatory for 2 days which increased symptoms. Got prescription refilled and feeling better.      OBJECTIVE: Updated below for progress note on 5/3   Observation:    Functional Mobility/Transfers:     SLS: Eyes open, Mild hip drop on L with mild pain on L /3    DL squat: depth 100% of normal with mild L knee pain returning to normal position 5/3   Test measurements:     Palpation: Tenderness L pes anserine and posterior L knee  Flexibility L 5/3 R Comment   Hamstring 170 170 Measured 90/90    Gastroc 100 95     Nikki WNL WNL   Ely WNL WNL      Strength L 5/3 R Comment   Quad 5 5     Hamstring 5 5     Hip  flexion 4+ 5     Hip abd 5 5     Hip add 5 mild medial knee pain 5     Hip ext  5 5          RESTRICTIONS/PRECAUTIONS: N/A    Exercises/Interventions:   Exercise/Equipment Resistance/Repetitions Other comments   Stretching/AROM       Bike x5' AROM 4/19    Hamstring 30\"hx3 L 4/8 supine   ITB stretch  30\"hx3 L 4/8 supine                    Strengthening      SLR + 30\"hx5 L ^5/3   Wall sit 30\"hx5 bilat ^5/3 45 deg knee flexion    SLR abd with hip ext/flex ^4/26 side lying   Bridges SL 3x10 L Start 4/19 L knee bent              Neuromuscular re-ed      TKE CC 6 plates  4O91 L ^6/70   LAQ with BS ^4/26 seated EOT 90<>45 deg   SLS with hip abd 3x10 L ^5/3 standing on airex    LTD 3x10 L ^5/3 6\" step    Hip hike 3x10 L 5/3 reintroduced d/t mild hip drop with SLS   SLB 30\"hx3 L ^5/3 [place] postural stability L11           Quantum machines       Leg press   110# 3x10 DL concentric, SL eccentric L ^5/3 90<> 20 deg    Leg extension  20# 3x10 DL concentric, SL eccentric L  ^5/3 90<>45    Leg curl               Manual interventions                Pierre taping  4/8 4/19 completed d/t positive response after last visit                Therapeutic Exercise and NMR EXR  [x] (46137) Provided verbal/tactile cueing for activities related to strengthening, flexibility, endurance, ROM for improvements in LE, proximal hip, and core control with self care, mobility, lifting, ambulation.  [] (31858) Provided verbal/tactile cueing for activities related to improving balance, coordination, kinesthetic sense, posture, motor skill, proprioception  to assist with LE, proximal hip, and core control in self care, mobility, lifting, ambulation and eccentric single leg control.      NMR and Therapeutic Activities:    [x] (81391 or 50598) Provided verbal/tactile cueing for activities related to improving balance, coordination, kinesthetic sense, posture, motor skill, proprioception and motor activation to allow for proper function of core, proximal hip and LE with self care and ADLs  [] (06976) Gait Re-education- Provided training and instruction to the patient for proper LE, core and proximal hip recruitment and positioning and eccentric body weight control with ambulation re-education including up and down stairs     Home Exercise Program:    [x] (82914) Reviewed/Progressed HEP activities related to strengthening, flexibility, endurance, ROM of core, proximal hip and LE for functional self-care, mobility, lifting and ambulation/stair navigation   [] (99938)Reviewed/Progressed HEP activities related to improving balance, coordination, kinesthetic sense, posture, motor skill, proprioception of core, proximal hip and LE for self care, mobility, lifting, and ambulation/stair navigation      Manual Treatments:  PROM / STM / Oscillations-Mobs:  G-I, II, III, IV (PA's, Inf., Post.)  [] (94338) Provided manual therapy to mobilize LE, proximal hip and/or LS spine soft tissue/joints for the purpose of modulating pain, promoting relaxation,  increasing ROM, reducing/eliminating soft tissue swelling/inflammation/restriction, improving soft tissue extensibility and allowing for proper ROM for normal function with self care, mobility, lifting and ambulation. Modalities:  CP L knee x10' 5/3    Charges:  Timed Code Treatment Minutes: 47'   Total Treatment Minutes: 2:45-4:04  78'       [] EVAL (LOW) 73740 (typically 20 minutes face-to-face)  [] EVAL (MOD) 41525 (typically 30 minutes face-to-face)  [] EVAL (HIGH) 81516 (typically 45 minutes face-to-face)  [] RE-EVAL   [x] OJ(98990) x 2  [] IONTO  [x] NMR (16381) x 1    [] VASO  [] Manual (07136) x      [] Other:  [x] TA x 1     [] Mech Traction (20350)  [] ES(attended) (64720)      [] ES (un) (90627):     GOALS:   Patient stated goal: Eliminate knee pain in order to return to exercising independently     [x]? Progressing: []?  Met: []? Not Met: []? Adjusted     Therapist goals for Patient:   Short Term Goals: To be achieved in: 3 weeks  1. Independent in HEP and progression per patient tolerance, in order to prevent re-injury. []? Progressing: [x]? Met: []? Not Met: []? Adjusted   2. Patient will have a decrease in pain to facilitate improvement in movement, function, and ADLs as indicated by Functional Deficits. []? Progressing: [x]? Met: []? Not Met: []? Adjusted     Long Term Goals: To be achieved in: 6 weeks  1. Patient will score a 71 or higher on FOTO knee assessment indicating subjective ability to complete advanced ambulation without pain or issues. [x]? Progressing: []? Met: []? Not Met: []? Adjusted  2. Patient will demonstrate increased L LE flexibility that is equal to R causing decrease tension at L knee and ability for pt to sit for prolonged periods of time without c/o knee pain    []? Progressing: [x]? Met: []? Not Met: []? Adjusted  3. Patient will demonstrate an increase in L LE Strength to 5/5 in all directions tested to allow for proper functional mobility such as negotiating a flight of stairs with proper sequencing and no c/o pain. [x]? Progressing: []? Met: []? Not Met: []? Adjusted  4. Patient will complete DL squat with normal depth and no c/o pain. [x]? Progressing: []? Met: []? Not Met: []? Adjusted    Overall Progression Towards Functional goals/ Treatment Progress Update:  [x] Patient is progressing as expected towards functional goals listed. [] Progression is slowed due to complexities/Impairments listed. [] Progression has been slowed due to co-morbidities.   [] Plan just implemented, too soon to assess goals progression <30days   [] Goals require adjustment due to lack of progress  [] Patient is not progressing as expected and requires additional follow up with physician  [] Other    Prognosis for POC: [x] Good [] Fair  [] Poor      Patient requires continued skilled intervention: [x] Yes  [] No    Treatment/Activity Tolerance:  [x] Patient able to complete treatment  [] Patient limited by fatigue  [] Patient limited by pain     [] Patient limited by other medical complications  [] Other: 5/3 Pt is responding well to treatment with increase in LE flexibility and strength allowing for decrease in severity of symptoms. She was challenged with increase in intensity of treatment but no increase in symptoms. Increase time spent reviewing activation of hip abductors to maintain level hips and avoid hip drop with functional movement with squatting and stairs. Patient Education:         5/3 Updated HEP and reviewed progress of exercises in PT to further increase strength and stability at knee in order to increase function. 4/26 Updated HEP   4/19 Updated HEP based on tolerance to treatment. 4/8 Educated on activity modification based on symptoms, proper muscle activation, use of taping annd icing for symptom control. HEP instruction:   Access Code: [email or web address]        PLAN: See eval  [x] Continue per plan of care [] Alter current plan (see comments above)  [x] Plan of care initiated [] Hold pending MD visit [] Discharge  5/3 Continue with PT 1x a week for 2-3 weeks. Monitor symptoms and progress strengthening and dynamic balance in weight bearing as tolerated. Electronically signed by:  Karina Boss PT    Note: If patient does not return for scheduled/ recommended follow up visits, this note will serve as a discharge from care along with most recent update on progress.

## 2022-05-10 ENCOUNTER — HOSPITAL ENCOUNTER (OUTPATIENT)
Dept: PHYSICAL THERAPY | Age: 37
Setting detail: THERAPIES SERIES
Discharge: HOME | End: 2022-05-10
Payer: COMMERCIAL

## 2022-05-10 PROCEDURE — 97112 NEUROMUSCULAR REEDUCATION: CPT

## 2022-05-10 PROCEDURE — 97110 THERAPEUTIC EXERCISES: CPT

## 2022-05-10 NOTE — FLOWSHEET NOTE
[facility], [address] [place] [address], [address] [place] [address]  Phone: [phone]   Fax:     [phone]    Physical Therapy Daily Treatment Note  Date:  5/10/2022    Patient Name:  Kena Dobbs    :  1985  MRN: [id number]  Restrictions/Precautions:    Medical/Treatment Diagnosis Information:  · Diagnosis: M25.562 Left knee pain  · Treatment Diagnosis: M25.562 Left knee pain  Insurance/Certification information:  PT Insurance Information: [place]  Physician Information:  Referring Practitioner: Ester Fletcher PA-C  Has the plan of care been signed (Y/N):        [x]  Yes  []  No     Date of Patient follow up with Physician:       Is this a Progress Report:     []  Yes  [x]  No        If Yes:  Date Range for reporting period:  Beginning   Ending 5/3    Progress report will be due (10 Rx or 30 days whichever is less):        Recertification will be due (POC Duration  / 90 days whichever is less): 22         Visit # Insurance Allowable Auth Required   5  5/10  [place]  40 visits  3 used []  Yes [x]  No        Functional Scale:   FOTO knee 54    Date assessed: 5/3  FOTO knee 47    Date assessed:  22       Latex Allergy:  [x]NO      []YES  Preferred Language for Healthcare:   [x]English       []other:    Pain level:  0/10 510    SUBJECTIVE:  5/10 Pt states she went on a long walk 5/6 for two miles with no pain on flight surfaces but had some irritation with walking up hills. Still has some pain with squatting and stairs but not as severe.      OBJECTIVE: Updated below for progress note on 5/3   Observation:    Functional Mobility/Transfers:     SLS: Eyes open, Mild hip drop on L with mild pain on L 5/3    DL squat: depth 100% of normal with mild L knee pain returning to normal position 5/3   Test measurements:     Palpation: Tenderness L pes anserine and posterior L knee  Flexibility L 5/3 R Comment   Hamstring 170 170 Measured 90/90    Gastroc 100 95     Nikki WNL WNL     Ely WNL WNL      Strength L 5/3 R Comment   Quad 5 5     Hamstring 5 5     Hip  flexion 4+ 5     Hip abd 5 5     Hip add 5 mild medial knee pain 5     Hip ext  5 5          RESTRICTIONS/PRECAUTIONS: N/A    Exercises/Interventions:   Exercise/Equipment Resistance/Repetitions Other comments   Stretching/AROM       Bike x5' AROM 4/19    Hamstring 30\"hx3 L 4/8 supine   ITB stretch  30\"hx3 L 4/8 supine                    Strengthening      SLR + 30\"hx3 L 5/10 stopped after 3 reps d/t fatigue   Wall sit 30\"hx3 bilat ^5/10 60 deg knee flexion    SLR abd with hip ext/flex ^4/26 side lying   Bridges SL 2x10 alt LE during bridge ^5/10    Side stepping Blue loop @ knees 3 laps Start 5/10   Band walks Blue loop @ knees 3 laps Start 5/10 forward/ backward          Neuromuscular re-ed      TKE CC 7 plates  6I45 L ^4/21   LAQ with BS ^4/26 seated EOT 90<>45 deg   SLS with hip abd 3x10 L ^5/3 standing on airex    LTD 3x10 L ^5/3 6\" step    Hip hike  5/3 reintroduced d/t mild hip drop with SLS   SLB 30\"hx3 L ^5/10 Grid2020 postural stability L10           Quantum machines       Leg press   120# 3x10 DL concentric, SL eccentric L ^5/10 90<> 20 deg    Leg extension  30# 3x10 DL concentric, SL eccentric L  ^5/10 90<>45    Leg curl               Manual interventions                Pierre taping  4/8 4/19 completed d/t positive response after last visit                Therapeutic Exercise and NMR EXR  [x] (48015) Provided verbal/tactile cueing for activities related to strengthening, flexibility, endurance, ROM for improvements in LE, proximal hip, and core control with self care, mobility, lifting, ambulation.  [] (71756) Provided verbal/tactile cueing for activities related to improving balance, coordination, kinesthetic sense, posture, motor skill, proprioception  to assist with LE, proximal hip, and core control in self care, mobility, lifting, ambulation and eccentric single leg control. NMR and Therapeutic Activities:    [x] (25118 or 33940) Provided verbal/tactile cueing for activities related to improving balance, coordination, kinesthetic sense, posture, motor skill, proprioception and motor activation to allow for proper function of core, proximal hip and LE with self care and ADLs  [] (05824) Gait Re-education- Provided training and instruction to the patient for proper LE, core and proximal hip recruitment and positioning and eccentric body weight control with ambulation re-education including up and down stairs     Home Exercise Program:    [x] (29182) Reviewed/Progressed HEP activities related to strengthening, flexibility, endurance, ROM of core, proximal hip and LE for functional self-care, mobility, lifting and ambulation/stair navigation   [] (71873)Reviewed/Progressed HEP activities related to improving balance, coordination, kinesthetic sense, posture, motor skill, proprioception of core, proximal hip and LE for self care, mobility, lifting, and ambulation/stair navigation      Manual Treatments:  PROM / STM / Oscillations-Mobs:  G-I, II, III, IV (PA's, Inf., Post.)  [] (09235) Provided manual therapy to mobilize LE, proximal hip and/or LS spine soft tissue/joints for the purpose of modulating pain, promoting relaxation,  increasing ROM, reducing/eliminating soft tissue swelling/inflammation/restriction, improving soft tissue extensibility and allowing for proper ROM for normal function with self care, mobility, lifting and ambulation.      Modalities:  CP L knee x10' 5/10    Charges:  Timed Code Treatment Minutes: 40'   Total Treatment Minutes: 2:54-3:53  59       [] EVAL (LOW) 09502 (typically 20 minutes face-to-face)  [] EVAL (MOD) 53482 (typically 30 minutes face-to-face)  [] EVAL (HIGH) 22726 (typically 45 minutes face-to-face)  [] RE-EVAL   [x] SN(71337) x 2  [] IONTO  [x] NMR (00292) x 1    [] VASO  [] Manual (22286) x      [] Other:  [] TA x      [] Ben Da Silva Traction (90347)  [] ES(attended) (95282)      [] ES (un) (98579):     GOALS:   Patient stated goal: Eliminate knee pain in order to return to exercising independently     [x]? Progressing: []? Met: []? Not Met: []? Adjusted     Therapist goals for Patient:   Short Term Goals: To be achieved in: 3 weeks  1. Independent in HEP and progression per patient tolerance, in order to prevent re-injury. []? Progressing: [x]? Met: []? Not Met: []? Adjusted   2. Patient will have a decrease in pain to facilitate improvement in movement, function, and ADLs as indicated by Functional Deficits. []? Progressing: [x]? Met: []? Not Met: []? Adjusted     Long Term Goals: To be achieved in: 6 weeks  1. Patient will score a 71 or higher on FOTO knee assessment indicating subjective ability to complete advanced ambulation without pain or issues. [x]? Progressing: []? Met: []? Not Met: []? Adjusted  2. Patient will demonstrate increased L LE flexibility that is equal to R causing decrease tension at L knee and ability for pt to sit for prolonged periods of time without c/o knee pain    []? Progressing: [x]? Met: []? Not Met: []? Adjusted  3. Patient will demonstrate an increase in L LE Strength to 5/5 in all directions tested to allow for proper functional mobility such as negotiating a flight of stairs with proper sequencing and no c/o pain. [x]? Progressing: []? Met: []? Not Met: []? Adjusted  4. Patient will complete DL squat with normal depth and no c/o pain. [x]? Progressing: []? Met: []? Not Met: []? Adjusted    Overall Progression Towards Functional goals/ Treatment Progress Update:  [x] Patient is progressing as expected towards functional goals listed. [] Progression is slowed due to complexities/Impairments listed. [] Progression has been slowed due to co-morbidities.   [] Plan just implemented, too soon to assess goals progression <30days   [] Goals require adjustment due to lack of progress  [] Patient is not progressing as expected and requires additional follow up with physician  [] Other    Prognosis for POC: [x] Good [] Fair  [] Poor      Patient requires continued skilled intervention: [x] Yes  [] No    Treatment/Activity Tolerance:  [x] Patient able to complete treatment  [] Patient limited by fatigue  [] Patient limited by pain     [] Patient limited by other medical complications  [] Other: 5/10 challenged with increase in intensity of treatment specifically alternating SL bridge. Demonstrated appropriate form with exercises and no increase in symptoms therefore decrease time focusing on form with functional movement . D/t fatigue only completed 3 reps of SLR+ compared to 5 reps last visit. Patient Education:         5/10 Updated HEP   5/3 Updated HEP and reviewed progress of exercises in PT to further increase strength and stability at knee in order to increase function. 4/26 Updated HEP   4/19 Updated HEP based on tolerance to treatment. 4/8 Educated on activity modification based on symptoms, proper muscle activation, use of taping annd icing for symptom control. HEP instruction:   Access Code: [email or web address]         PLAN: See eval  [x] Continue per plan of care [] Alter current plan (see comments above)  [x] Plan of care initiated [] Hold pending MD visit [] Discharge  5/3 Continue with PT 1x a week for 2-3 weeks. Monitor symptoms and progress strengthening and dynamic balance in weight bearing as tolerated. Electronically signed by:  Shayna Negrete PT    Note: If patient does not return for scheduled/ recommended follow up visits, this note will serve as a discharge from care along with most recent update on progress.

## 2022-05-17 ENCOUNTER — HOSPITAL ENCOUNTER (OUTPATIENT)
Dept: PHYSICAL THERAPY | Age: 37
Setting detail: THERAPIES SERIES
Discharge: HOME | End: 2022-05-17
Payer: COMMERCIAL

## 2022-05-17 NOTE — FLOWSHEET NOTE
Physical Therapy  Cancellation/No-show Note  Patient Name:  Hollis Flores  :  1985   Date:  2022  Cancelled visits to date: 1  No-shows to date: 1    For today's appointment patient:  [x]  Cancelled  []  Rescheduled appointment  []  No-show     Reason given by patient:  []  Patient ill  []  Conflicting appointment  []  No transportation    []  Conflict with work  [x]  No reason given  []  Other:     Comments:      Electronically signed by:  Brenda Mustafa PT, PT

## 2022-05-20 ENCOUNTER — HOSPITAL ENCOUNTER (OUTPATIENT)
Dept: PHYSICAL THERAPY | Age: 37
Setting detail: THERAPIES SERIES
Discharge: HOME | End: 2022-05-20
Payer: COMMERCIAL

## 2022-05-20 NOTE — FLOWSHEET NOTE
Physical Therapy  Cancellation/No-show Note  Patient Name:  Victoria Scott  :  1985   Date:  2022  Cancelled visits to date: 2  No-shows to date: 1    For today's appointment patient:  [x]  Cancelled  []  Rescheduled appointment  []  No-show     Reason given by patient:  []  Patient ill  []  Conflicting appointment  []  No transportation    []  Conflict with work  [x]  No reason given  []  Other:     Comments:      Electronically signed by:  Jayda Wayne PT, PT